# Patient Record
Sex: MALE | Race: WHITE | URBAN - METROPOLITAN AREA
[De-identification: names, ages, dates, MRNs, and addresses within clinical notes are randomized per-mention and may not be internally consistent; named-entity substitution may affect disease eponyms.]

---

## 2017-11-02 DIAGNOSIS — Q21.0 VENTRICULAR SEPTAL DEFECT: Primary | ICD-10-CM

## 2018-04-02 ENCOUNTER — OFFICE VISIT (OUTPATIENT)
Dept: PEDIATRIC CARDIOLOGY | Facility: CLINIC | Age: 23
End: 2018-04-02
Payer: COMMERCIAL

## 2018-04-02 ENCOUNTER — RADIANT APPOINTMENT (OUTPATIENT)
Dept: CARDIOLOGY | Facility: CLINIC | Age: 23
End: 2018-04-02
Payer: COMMERCIAL

## 2018-04-02 VITALS
BODY MASS INDEX: 27.29 KG/M2 | DIASTOLIC BLOOD PRESSURE: 78 MMHG | HEIGHT: 72 IN | HEART RATE: 57 BPM | SYSTOLIC BLOOD PRESSURE: 125 MMHG | WEIGHT: 201.5 LBS

## 2018-04-02 DIAGNOSIS — Q21.0 VENTRICULAR SEPTAL DEFECT: ICD-10-CM

## 2018-04-02 DIAGNOSIS — Q21.0 VSD (VENTRICULAR SEPTAL DEFECT): Primary | ICD-10-CM

## 2018-04-02 LAB — INTERPRETATION ECG - MUSE: NORMAL

## 2018-04-02 ASSESSMENT — PAIN SCALES - GENERAL: PAINLEVEL: NO PAIN (0)

## 2018-04-02 NOTE — NURSING NOTE
"Chief Complaint   Patient presents with     Heart Problem     VSD       Initial /78 (BP Location: Right arm, Patient Position: Sitting, Cuff Size: Adult Large)  Pulse 57  Ht 6' 0.05\" (183 cm)  Wt 201 lb 8 oz (91.4 kg)  BMI 27.29 kg/m2 Estimated body mass index is 27.29 kg/(m^2) as calculated from the following:    Height as of this encounter: 6' 0.05\" (183 cm).    Weight as of this encounter: 201 lb 8 oz (91.4 kg).  Medication Reconciliation: complete    "

## 2018-04-02 NOTE — PROGRESS NOTES
[facility] Note             Assessment and Plan:     Augie is a 22 year old male here for follow-up      IMP: s/p patch closure of a paramembranous ventricular septal defect (VSD), pulmonary valvotomy and suture closure of a patent foramen ovale (PFO).  No residual shunts. Good LV and RV function.  Doing well, no concerns    PLAN:    F/U in 5 years with Echo and transition to adult congenital cardiology program  No Activity Restrictions  Adherence to heart healthy diet, regular exercise habits, avoidance of tobacco products and maintenance of a healthy weight  No need for SBE Prophylaxis  Results were reviewed with the family.       Attending Attestation:      Echocardiographic images were reviewed by me.           History of Present Illness:    I was asked to see this patient by Primary Care Provider Pediatrics, [facility] to consult regarding VSD surgical closure.  S/p patch closure of a paramembranous ventricular septal defect (VSD), pulmonary valvotomy and suture closure of a patent foramen ovale (PFO).  Doing well, no concerns.  He is working two jobs, and he stays active. No shortness of breath, no chest pain, no cyanosis. His appetite is good, sleeps well.  His girlfriend is pregnant and I had discussed with him that she needs a level 2 ultrasound and to discuss with her OBG.    Last Echocardiogram - 2010, Ventricular septal defect after repair with a tiny residual ventricular septal defect that is pressure restrictive. Good systolic left ventricular function with no pericardial effusion.       I have reviewed past medical family and social history with the patient or family.    Past Medical History:   No Recent Hospitalizations  History of ADHD    Family and Social History:   No history of congenital heart disease  Girlfriend is pregnant         Review of Systems:   A comprehensive Review of Systems was performed is negative other than noted in the HPI  CV  "and Pulm ROS  are neg  No HAYWOOD, sob, cyanosis, edema, cough, wheeze, syncope, chest pain, palpitations          Medications:   I have reviewed this patient's current medications        Current Outpatient Prescriptions   Medication     escitalopram (LEXAPRO) 10 MG tablet     No current facility-administered medications for this visit.          Physical Exam:     Blood pressure 125/78, pulse 57, height 6' 0.05\" (183 cm), weight 201 lb 8 oz (91.4 kg).        General - NAD, awake, alert   HEENT - NC/AT EOMI   Cardiac - RRR nl S1 and S2 heard, no systolic murmur.  No diastolic murmur No click, thrill or heave   Respiratory - Lungs clear, no rales   Abdominal - Liver at RCM   Extremity  Nl pulses in brachial and femoral areas, No Clubbing, Edema, Cyanosis   Skin - No rash   Neuro - Nl gait, posture, tone         Labs     EKG results:         EKG with today's visit  V-rate of 62/min, sinus,  msec,  msec. RBBB.       Echocardiography today:         Results:Post patch closure of a perimembranous ventricular septal defect. There is no residual ventricular level shunt. The left and right ventricles have normal chamber size, wall thickness, and systolic function. The calculated biplane  left ventricular ejection fraction is 61 %. Trivial tricuspid valve insufficiency. Normal right ventricular systolic pressure.        Sincerely,    Shelley Penny MD,EBONI  Pediatric Cardiologist   of Pediatrics  Director, Fetal Cardiology and Co-Director of Echocardiography laboratory  [facility]      CC:   Copy to patient  Lisa Layton Richard Jr  [address] [address]  "

## 2018-04-02 NOTE — LETTER
4/2/2018      RE: Augie Layton  [address] [address]       [facility] Note             Assessment and Plan:     Augie is a 22 year old male here for follow-up      IMP: s/p patch closure of a paramembranous ventricular septal defect (VSD), pulmonary valvotomy and suture closure of a patent foramen ovale (PFO).   No residual shunts. Good LV and RV function.  Doing well, no concerns    PLAN:    F/U in 5 years with Echo and transition to adult congenital cardiology program  No Activity Restrictions  Adherence to heart healthy diet, regular exercise habits, avoidance of tobacco products and maintenance of a healthy weight  No need for SBE Prophylaxis  Results were reviewed with the family.       Attending Attestation:      Echocardiographic images were reviewed by me.           History of Present Illness:    I was asked to see this patient by Primary Care Provider Pediatrics, [facility] to consult regarding VSD surgical closure.  S/p patch closure of a paramembranous ventricular septal defect (VSD), pulmonary valvotomy and suture closure of a patent foramen ovale (PFO).   Doing well, no concerns.  He is working two jobs, and he stays active. No shortness of breath, no chest pain, no cyanosis. His appetite is good, sleeps well.  His girlfriend is pregnant and I had discussed with him that she needs a level 2 ultrasound and to discuss with her OBG.    Last Echocardiogram - 2010, Ventricular septal defect after repair with a tiny residual ventricular septal defect that is pressure restrictive. Good systolic left ventricular function with no pericardial effusion.       I have reviewed past medical family and social history with the patient or family.    Past Medical History:   No Recent Hospitalizations  History of ADHD    Family and Social History:   No history of congenital heart disease  Girlfriend is pregnant         Review of Systems:   A  "comprehensive Review of Systems was performed is negative other than noted in the HPI  CV and Pulm ROS  are neg  No HAYWOOD, sob, cyanosis, edema, cough, wheeze, syncope, chest pain, palpitations          Medications:   I have reviewed this patient's current medications        Current Outpatient Prescriptions   Medication     escitalopram (LEXAPRO) 10 MG tablet     No current facility-administered medications for this visit.          Physical Exam:     Blood pressure 125/78, pulse 57, height 6' 0.05\" (183 cm), weight 201 lb 8 oz (91.4 kg).        General - NAD, awake, alert   HEENT - NC/AT EOMI   Cardiac - RRR nl S1 and S2 heard, no systolic murmur.  No diastolic murmur No click, thrill or heave   Respiratory - Lungs clear, no rales   Abdominal - Liver at RCM   Extremity  Nl pulses in brachial and femoral areas, No Clubbing, Edema, Cyanosis   Skin - No rash   Neuro - Nl gait, posture, tone         Labs     EKG results:         EKG with today's visit  V-rate of 62/min, sinus,  msec,  msec. RBBB.       Echocardiography today:         Results:Post patch closure of a perimembranous ventricular septal defect. There is no residual ventricular level shunt. The left and right ventricles have normal chamber size, wall thickness, and systolic function. The calculated biplane  left ventricular ejection fraction is 61 %. Trivial tricuspid valve insufficiency. Normal right ventricular systolic pressure.        Sincerely,    Shelley Penny MD,EBONI  Pediatric Cardiologist   of Pediatrics  Director, Fetal Cardiology and Co-Director of Echocardiography laboratory  [facility]      Copy to patient  CalinLisa Richard Jr  [address] [address]    "

## 2018-04-02 NOTE — PATIENT INSTRUCTIONS
[facility]  Pediatric Specialty Clinic [place]      Pediatric Call Center [phone], option 1  Elisa Butterfield RN Care Coordinator:  [phone]    After Hours Emergency:  [phone].  Ask for the on-call pediatric doctor for the specialty you are calling for be paged.    Prescription Renewals:  Your pharmacy must fax requests to [phone].  Please allow 2-3 days for prescriptions to be authorized.    If your physician has ordered an CT or MRI, you may schedule this test by calling [facility] in [place] at [phone].

## 2018-04-02 NOTE — MR AVS SNAPSHOT
"              After Visit Summary   2018    Augie Layton    MRN: [id number]           Patient Information     Date Of Birth          1995        Visit Information        Provider Department      2018 9:00 AM Shelley Penny MD [facility]        Today's Diagnoses     VSD (ventricular septal defect)    -  1      Care Instructions    [facility]  Pediatric Specialty Clinic [place]      Pediatric Call Center [phone], option 1  Elisa Butterfield RN Care Coordinator:  [phone]    After Hours Emergency:  [phone].  Ask for the on-call pediatric doctor for the specialty you are calling for be paged.    Prescription Renewals:  Your pharmacy must fax requests to [phone].  Please allow 2-3 days for prescriptions to be authorized.    If your physician has ordered an CT or MRI, you may schedule this test by calling [facility] in [place] at [phone].            Follow-ups after your visit        Follow-up notes from your care team     Return in about 5 years (around 4/10/2023) for Physical Exam, Echo.      Who to contact     Please call your clinic at [phone] to:    Ask questions about your health    Make or cancel appointments    Discuss your medicines    Learn about your test results    Speak to your doctor            Additional Information About Your Visit        Care EveryWhere ID     This is your Care EveryWhere ID. This could be used by other organizations to access your [facility] medical records  [id number]        Your Vitals Were     Pulse Height BMI (Body Mass Index)             57 6' 0.05\" (183 cm) 27.29 kg/m2          Blood Pressure from Last 3 Encounters:   18 125/78   08/03/15 125/78   06/29/15 120/80    Weight from Last 3 Encounters:   18 201 lb 8 oz (91.4 kg)   06/29/15 175 lb (79.4 kg) (77 %)*     * Growth percentiles are based on CDC 2-20 Years data.              We Performed " the Following     EKG 12-lead complete w/read - Same Day        Primary Care Provider Fax #    [facility] [phone] [address] [address]        Equal Access to Services     KHARI MAKI : Hadii aad ku hadmargosophie Bhavanakayley, wacarlosda luqadaha, qabrodyta kaalmada gwen, darien garciajosephine estrellaabilio teelorenzo esparza. So [facility] [phone].    ATENCIÓN: Si habla español, tiene a rubin disposición servicios gratuitos de asistencia lingüística. Llame al [phone].    We comply with applicable federal civil rights laws and [place] laws. We do not discriminate on the basis of race, color, national origin, age, disability, sex, sexual orientation, or gender identity.            Thank you!     Thank you for choosing [facility]  for your care. Our goal is always to provide you with excellent care. Hearing back from our patients is one way we can continue to improve our services. Please take a few minutes to complete the written survey that you may receive in the mail after your visit with us. Thank you!             Your Updated Medication List - Protect others around you: Learn how to safely use, store and throw away your medicines at www.disposemymeds.org.          This list is accurate as of 4/2/18  9:25 AM.  Always use your most recent med list.                   Brand Name Dispense Instructions for use Diagnosis    escitalopram 10 MG tablet    LEXAPRO    30 tablet    Take 1 tablet (10 mg) by mouth daily